# Patient Record
Sex: FEMALE | Race: WHITE | Employment: FULL TIME | URBAN - METROPOLITAN AREA
[De-identification: names, ages, dates, MRNs, and addresses within clinical notes are randomized per-mention and may not be internally consistent; named-entity substitution may affect disease eponyms.]

---

## 2018-04-18 ENCOUNTER — APPOINTMENT (OUTPATIENT)
Dept: GENERAL RADIOLOGY | Age: 23
End: 2018-04-18
Attending: NURSE PRACTITIONER

## 2018-04-18 ENCOUNTER — APPOINTMENT (OUTPATIENT)
Dept: ULTRASOUND IMAGING | Age: 23
End: 2018-04-18
Attending: NURSE PRACTITIONER

## 2018-04-18 ENCOUNTER — HOSPITAL ENCOUNTER (EMERGENCY)
Age: 23
Discharge: HOME | End: 2018-04-18
Attending: EMERGENCY MEDICINE

## 2018-04-18 VITALS
HEIGHT: 66 IN | SYSTOLIC BLOOD PRESSURE: 132 MMHG | TEMPERATURE: 98 F | HEART RATE: 92 BPM | BODY MASS INDEX: 27.32 KG/M2 | RESPIRATION RATE: 18 BRPM | OXYGEN SATURATION: 98 % | DIASTOLIC BLOOD PRESSURE: 72 MMHG | WEIGHT: 170 LBS

## 2018-04-18 DIAGNOSIS — S89.91XA LOWER LEG INJURY, RIGHT, INITIAL ENCOUNTER: Primary | ICD-10-CM

## 2018-04-18 PROCEDURE — 93971 EXTREMITY STUDY: CPT | Performed by: NURSE PRACTITIONER

## 2018-04-18 PROCEDURE — 99284 EMERGENCY DEPT VISIT MOD MDM: CPT

## 2018-04-18 PROCEDURE — 73610 X-RAY EXAM OF ANKLE: CPT | Performed by: NURSE PRACTITIONER

## 2018-04-18 PROCEDURE — 29515 APPLICATION SHORT LEG SPLINT: CPT

## 2018-04-18 PROCEDURE — 73590 X-RAY EXAM OF LOWER LEG: CPT | Performed by: NURSE PRACTITIONER

## 2018-04-19 NOTE — ED PROVIDER NOTES
Patient Seen in: [facility] Emergency Department In [place]    History   Patient presents with:  Lower Extremity Injury (musculoskeletal)    Stated Complaint: Right Leg Injury 1 week ago.  Would like xrays    52-year-old female presents today with increase well-nourished. No distress. Neck: Normal range of motion. Neck supple. Pulmonary/Chest: Effort normal.   Musculoskeletal:   Pain with palpation to the medial aspect of the right lower leg.   Bruising extends from the knee down to the midfoot with some obtained. COMPARISON:  None. INDICATIONS:  Right Leg Injury 1 week ago. Would like xrays  PATIENT STATED HISTORY: (As transcribed by Technologist)  Patient states she fell 1 week ago and has brusing throughout right lower leg.   Patient has numbness and l

## 2021-06-06 ENCOUNTER — APPOINTMENT (OUTPATIENT)
Dept: URGENT CARE | Age: 26
End: 2021-06-06

## (undated) NOTE — LETTER
Date & Time: 4/18/2018, 9:57 PM  Patient: Agus Merritt  Encounter Provider(s):    MD Sawyer Martínez APN       To Whom It May Concern:    Joycelyn Villafuerte was seen and treated in our department on 4/18/2018.  She should be off wor

## (undated) NOTE — ED AVS SNAPSHOT
Chetan Gary   MRN: [id number]    Department:  [facility] Emergency Department in [place]   Date of Visit:  2018           Disclosure     Insurance plans vary and the physician(s) referred by the ER may not be covered by your plan.  Please conta tell this physician (or your personal doctor if your instructions are to return to your personal doctor) about any new or lasting problems. The primary care or specialist physician will see patients referred from the [facility] Emergency Department.  Bette Cochran